# Patient Record
Sex: MALE
[De-identification: names, ages, dates, MRNs, and addresses within clinical notes are randomized per-mention and may not be internally consistent; named-entity substitution may affect disease eponyms.]

---

## 2019-06-26 PROBLEM — Z00.00 ENCOUNTER FOR PREVENTIVE HEALTH EXAMINATION: Status: ACTIVE | Noted: 2019-06-26

## 2019-06-28 ENCOUNTER — NON-APPOINTMENT (OUTPATIENT)
Age: 24
End: 2019-06-28

## 2019-06-28 ENCOUNTER — APPOINTMENT (OUTPATIENT)
Dept: CARDIOLOGY | Facility: CLINIC | Age: 24
End: 2019-06-28
Payer: COMMERCIAL

## 2019-06-28 VITALS
RESPIRATION RATE: 16 BRPM | HEART RATE: 62 BPM | SYSTOLIC BLOOD PRESSURE: 105 MMHG | DIASTOLIC BLOOD PRESSURE: 65 MMHG | OXYGEN SATURATION: 99 % | WEIGHT: 153 LBS | HEIGHT: 71 IN | BODY MASS INDEX: 21.42 KG/M2

## 2019-06-28 DIAGNOSIS — Z82.49 FAMILY HISTORY OF ISCHEMIC HEART DISEASE AND OTHER DISEASES OF THE CIRCULATORY SYSTEM: ICD-10-CM

## 2019-06-28 DIAGNOSIS — Z83.49 FAMILY HISTORY OF OTHER ENDOCRINE, NUTRITIONAL AND METABOLIC DISEASES: ICD-10-CM

## 2019-06-28 DIAGNOSIS — Z78.9 OTHER SPECIFIED HEALTH STATUS: ICD-10-CM

## 2019-06-28 DIAGNOSIS — R10.9 UNSPECIFIED ABDOMINAL PAIN: ICD-10-CM

## 2019-06-28 PROCEDURE — 99244 OFF/OP CNSLTJ NEW/EST MOD 40: CPT

## 2019-06-28 PROCEDURE — 93000 ELECTROCARDIOGRAM COMPLETE: CPT

## 2019-06-28 NOTE — REASON FOR VISIT
[FreeTextEntry1] : This is  a 23 year old male presenting for cardiac revaluation with concerns of  of chest pain, his last . He recently graduated college and works out daily which includes weight lifting.\par \par This episode of chest pain started 2 weeks ago which he states lasted minutes and resolved with out taking any medications.. He denies any radiation, any associated lightheadedness, dizziness or diaphoresis. It was focal, non-radiating and not assoc with any movement position or activity.\par \par He has no significant medical history other than periodic abdominal discomfort . He denies smoking, drug use,caffeine,excessive stress or ETOH.\par \par  \par Had an episode was at the age of 15 which resolved with out any intervention\par He was evaluated by Dr. Barry Goldberg of  pediatric cardiology at the age of 15 for chest pain, records have been obtained and are incomplete.

## 2019-06-28 NOTE — ASSESSMENT
[FreeTextEntry1] : ECG- SR at 62 BPM, peaked T waves with early repolarization.\par \par Lab data 6/13/2019\par Chol   164\par HDL    52\par LDL    98\par Creat 0.90\par A1C   5.1\par HGB 16.5\par \par Impression\par This is a 23 year old physically active male patient with a new onset of atypical chest pain starting 2 weeks ago with no associated symptoms and spontaneously resolving.. At the age of 15 he was evaluated by Dr. Charles Gray of pediatric cardiology for chest pain, these records are incomplete for my review at this time\par \par His chest pain is focal and atypical . We can not  exclude musculoskeletal with regards to his weekly weightlifting. This pain has since resolved.The ECG is slightly abnormal but may be his baseline  and  seems to more likely reflect an early repolarization than anything else.  \par \par His lab are unremarkable, blood pressure is 105/65 in office.\par \par Plan\par 1. Have an ECHO  completed to assess cardiac function with concerns of chest pain and the aforementioned ECG.\par 2. A repeat ECG to establish a base line during our next office visit.\par \par 3.  Reassured pt and Mom at length.\par Follow up after cardiac studies are completes

## 2019-06-28 NOTE — REVIEW OF SYSTEMS
[Change In The Stool] : change in stool [FreeTextEntry1] : Other than as documented here and in the HPI, the thirteen point ROS is negative

## 2019-07-08 ENCOUNTER — APPOINTMENT (OUTPATIENT)
Dept: CARDIOLOGY | Facility: CLINIC | Age: 24
End: 2019-07-08
Payer: COMMERCIAL

## 2019-07-08 PROCEDURE — 93306 TTE W/DOPPLER COMPLETE: CPT

## 2019-09-19 ENCOUNTER — NON-APPOINTMENT (OUTPATIENT)
Age: 24
End: 2019-09-19

## 2019-09-19 ENCOUNTER — APPOINTMENT (OUTPATIENT)
Dept: CARDIOLOGY | Facility: CLINIC | Age: 24
End: 2019-09-19
Payer: COMMERCIAL

## 2019-09-19 VITALS
OXYGEN SATURATION: 99 % | SYSTOLIC BLOOD PRESSURE: 110 MMHG | BODY MASS INDEX: 20.58 KG/M2 | DIASTOLIC BLOOD PRESSURE: 70 MMHG | HEART RATE: 84 BPM | HEIGHT: 71 IN | WEIGHT: 147 LBS | RESPIRATION RATE: 16 BRPM

## 2019-09-19 DIAGNOSIS — R94.31 ABNORMAL ELECTROCARDIOGRAM [ECG] [EKG]: ICD-10-CM

## 2019-09-19 DIAGNOSIS — R07.9 CHEST PAIN, UNSPECIFIED: ICD-10-CM

## 2019-09-19 PROCEDURE — 99213 OFFICE O/P EST LOW 20 MIN: CPT

## 2019-09-19 NOTE — ASSESSMENT
[FreeTextEntry1] : ECG- SR at 74 BPM, LV criteria, normal for age.\par \par Echocardiogram 7/8/19\par EF 55%\par Normal LV function\par Trace mitral valve regurgitation\par Trace pulmonic valve regurgitation \par \par \par Lab data 6/13/2019\par Chol   164\par HDL    52\par LDL    98\par Creat 0.90\par A1C   5.1\par HGB 16.5\par \par Impression\par This is a 23 year old physically active male patient with a new onset of  focal and atypical chest pain experienced in  June of 2019. This discomfort was focal and  spontaneously resolving and suggestive of musculoskeletal as he continues to lift weights.\par \par This pain has since resolved His initial  ECG was slightly abnormal and returns for a repeat to establish a baseline. but may be his\par \par 1.Blood pressure today is 110/70.\par \par 2.Echocardiogram with normal LV function and WNL\par \par 3. ECG unremarkable, does meet criteria for low voltage but normal for his age.\par \par Plan\par 1. Continue to F/U with PCP as scheduled and have all blood work faxed to our office. \par 2.  Reassured pt and mother at length that all cardiac testing is stable and WNL.\par 3. There are no limitations to his activity level. \par \par \par Clinical follow up PRN

## 2019-09-19 NOTE — REASON FOR VISIT
[FreeTextEntry1] : This is  a 24 year old male presenting for cardiac revaluation . During out last office visit we discussed concerns  of focal non radiating chest pain which lasted minutes and resolved with out any intervention. This has not reoccurred.\par \par Denies chest pain, SOB, Palpitations, edema\par \par Continues to lift weights. \par \par There are no exertional complaints.\par \par Results of the echocardiogram completed on 7/8/19 were given over the phone. \par \par

## 2019-09-23 PROBLEM — R94.31 ABNORMAL EKG: Status: ACTIVE | Noted: 2019-06-28

## 2019-09-23 PROBLEM — R07.9 CHEST PAIN: Status: ACTIVE | Noted: 2019-06-28

## 2021-11-08 ENCOUNTER — NON-APPOINTMENT (OUTPATIENT)
Age: 26
End: 2021-11-08